# Patient Record
Sex: MALE | Race: WHITE | NOT HISPANIC OR LATINO | Employment: FULL TIME | URBAN - METROPOLITAN AREA
[De-identification: names, ages, dates, MRNs, and addresses within clinical notes are randomized per-mention and may not be internally consistent; named-entity substitution may affect disease eponyms.]

---

## 2021-04-08 ENCOUNTER — HOSPITAL ENCOUNTER (OUTPATIENT)
Dept: URGENT CARE | Facility: CLINIC | Age: 46
Discharge: HOME | End: 2021-04-08
Attending: EMERGENCY MEDICINE

## 2021-09-20 ENCOUNTER — OFFICE VISIT (OUTPATIENT)
Dept: FAMILY MEDICINE CLINIC | Facility: CLINIC | Age: 46
End: 2021-09-20

## 2021-09-20 VITALS
SYSTOLIC BLOOD PRESSURE: 129 MMHG | DIASTOLIC BLOOD PRESSURE: 70 MMHG | BODY MASS INDEX: 21.73 KG/M2 | TEMPERATURE: 98.1 F | HEART RATE: 94 BPM | HEIGHT: 72 IN | WEIGHT: 160.4 LBS | OXYGEN SATURATION: 96 %

## 2021-09-20 DIAGNOSIS — K21.9 GASTROESOPHAGEAL REFLUX DISEASE WITHOUT ESOPHAGITIS: ICD-10-CM

## 2021-09-20 DIAGNOSIS — Z12.5 PROSTATE CANCER SCREENING: ICD-10-CM

## 2021-09-20 DIAGNOSIS — Z72.0 TOBACCO ABUSE: ICD-10-CM

## 2021-09-20 DIAGNOSIS — D50.8 OTHER IRON DEFICIENCY ANEMIA: ICD-10-CM

## 2021-09-20 DIAGNOSIS — Z13.29 SCREENING FOR THYROID DISORDER: ICD-10-CM

## 2021-09-20 DIAGNOSIS — Z01.89 ROUTINE LAB DRAW: ICD-10-CM

## 2021-09-20 DIAGNOSIS — Z13.220 LIPID SCREENING: ICD-10-CM

## 2021-09-20 DIAGNOSIS — R10.13 EPIGASTRIC ABDOMINAL PAIN: ICD-10-CM

## 2021-09-20 DIAGNOSIS — Z76.89 ESTABLISHING CARE WITH NEW DOCTOR, ENCOUNTER FOR: Primary | ICD-10-CM

## 2021-09-20 PROCEDURE — 99203 OFFICE O/P NEW LOW 30 MIN: CPT | Performed by: NURSE PRACTITIONER

## 2021-09-20 RX ORDER — OMEPRAZOLE 20 MG/1
20 CAPSULE, DELAYED RELEASE ORAL DAILY
COMMUNITY
End: 2021-09-28 | Stop reason: SDUPTHER

## 2021-09-20 NOTE — PROGRESS NOTES
"Chief Complaint  Establish Care and Heartburn  Abdominal pain  Subjective          Abhijit Graham presents to [facility]  History of Present Illness    New patient to establish primary care, he has not had PCP since he was a child.  Patient states he uses ER for primary care.    Patient complaining of stomach issues, abdominal cramping.  Patient states he was dx with H. Pylori 9/14/20 but has not completed treatment in the last year d/t cost of medication.  Patient states he had EGD in 2017 in [place] which showed he had unknown type  Of ulcer.  Patient states he had EGD done at the time d/t loosing so much blood and passing out.  Patient currently takes Prilosec OTC with overall good control of symptoms. Pt has been taking daily.  Patient states he previously took Protonix, which worked better, but he ran out. Pt does report nausea, worse in AM, denies any vomiting. He denies any bowel or bladder issues.       Past Medical History:   Diagnosis Date   • Anemia    • GERD (gastroesophageal reflux disease)          No Known Allergies       History reviewed. No pertinent surgical history.       Social History     Tobacco Use   • Smoking status: Current Every Day Smoker     Packs/day: 1.00     Years: 30.00     Pack years: 30.00   • Smokeless tobacco: Never Used   Substance Use Topics   • Alcohol use: Never         Family History   Adopted: Yes          Current Outpatient Medications on File Prior to Visit   Medication Sig   • omeprazole (priLOSEC) 20 MG capsule Take 20 mg by mouth Daily.     No current facility-administered medications on file prior to visit.         Immunization History   Administered Date(s) Administered   • COVID-19 (PFIZER) 04/27/2021, 05/18/2021         /70 (BP Location: Left arm, Patient Position: Sitting)   Pulse 94   Temp 98.1 °F (36.7 °C) (Oral)   Ht 182.9 cm (72\")   Wt 72.8 kg (160 lb 6.4 oz)   SpO2 96%   BMI 21.75 kg/m²   "           Physical Exam  Vitals reviewed.   Constitutional:       Appearance: Normal appearance. He is well-developed.   HENT:      Head: Normocephalic and atraumatic.      Right Ear: External ear normal.      Left Ear: External ear normal.      Mouth/Throat:      Pharynx: No oropharyngeal exudate.   Eyes:      Conjunctiva/sclera: Conjunctivae normal.      Pupils: Pupils are equal, round, and reactive to light.   Cardiovascular:      Rate and Rhythm: Normal rate and regular rhythm.      Heart sounds: No murmur heard.   No friction rub. No gallop.    Pulmonary:      Effort: Pulmonary effort is normal.      Breath sounds: Normal breath sounds. No wheezing or rhonchi.   Abdominal:      General: Abdomen is flat. Bowel sounds are normal.      Palpations: Abdomen is soft.      Tenderness: There is abdominal tenderness in the epigastric area.   Skin:     General: Skin is warm and dry.   Neurological:      Mental Status: He is alert and oriented to person, place, and time.      Cranial Nerves: No cranial nerve deficit.   Psychiatric:         Mood and Affect: Mood and affect normal.         Behavior: Behavior normal.         Thought Content: Thought content normal.         Judgment: Judgment normal.             Result Review :     The following data was reviewed by: LINA Michaels on 09/20/2021:    CMP    CMP 12/31/20 4/14/21   Glucose 91 153 (A)   BUN 11 10   Creatinine 0.95 1.03   Sodium 134 (A) 136   Potassium 4.0 4.3   Chloride 98 (A) 102   Calcium 8.8 8.9   Albumin 4.0 4.1   Total Bilirubin 0.19 (A) 0.28   Alkaline Phosphatase 109 97   AST (SGOT) 18 20   ALT (SGPT) 29 17   (A) Abnormal value            CBC    CBC 12/31/20 4/14/21   WBC 12.54 (A) 8.61   RBC 4.31 (A) 5.24   Hemoglobin 11.3 (A) 11.6 (A)   Hematocrit 35.3 (A) 39.9 (A)   MCV 81.9 76.1 (A)   MCH 26.2 (A) 22.1 (A)   MCHC 32.0 (A) 29.1 (A)   RDW 14.6 (A) 17.9 (A)   Platelets 536 (A) 504 (A)   (A) Abnormal value                            Assessment and  Plan      Diagnoses and all orders for this visit:    1. Establishing care with new doctor, encounter for (Primary)    2. Gastroesophageal reflux disease without esophagitis  Comments:  Hold Prilosec, will check for H. pylori.  Further treatment contingent upon results.  Orders:  -     Helicobacter Pylori, IgA IgG IgM; Future    3. Routine lab draw    4. Prostate cancer screening  -     PSA Screen    5. Lipid screening  -     Lipid Panel; Future    6. Screening for thyroid disorder  -     TSH; Future    7. Epigastric abdominal pain  -     Comprehensive Metabolic Panel; Future  -     CBC & Differential; Future    8. Tobacco abuse  Comments:  Encouraged smoking cessation    9. Other iron deficiency anemia  -     Iron Profile        Abhijit Graham  reports that he has been smoking. He has a 30.00 pack-year smoking history. He has never used smokeless tobacco.. I have educated him on the risk of diseases from using tobacco products such as cancer, COPD and heart disease.     I advised him to quit and he is not willing to quit.    I spent 5 minutes counseling the patient.             Follow Up {Instructions Charge Capture  Follow-up Communications :23}    No follow-ups on file.    Patient was given instructions and counseling regarding his condition or for health maintenance advice. Please see specific information pulled into the AVS if appropriate.

## 2021-09-21 ENCOUNTER — LAB (OUTPATIENT)
Dept: LAB | Facility: HOSPITAL | Age: 46
End: 2021-09-21

## 2021-09-21 DIAGNOSIS — R10.13 EPIGASTRIC ABDOMINAL PAIN: ICD-10-CM

## 2021-09-21 DIAGNOSIS — Z13.29 SCREENING FOR THYROID DISORDER: ICD-10-CM

## 2021-09-21 DIAGNOSIS — K21.9 GASTROESOPHAGEAL REFLUX DISEASE WITHOUT ESOPHAGITIS: ICD-10-CM

## 2021-09-21 DIAGNOSIS — Z13.220 LIPID SCREENING: ICD-10-CM

## 2021-09-21 LAB
ALBUMIN SERPL-MCNC: 4.4 G/DL (ref 3.5–5.2)
ALBUMIN/GLOB SERPL: 2.2 G/DL
ALP SERPL-CCNC: 81 U/L (ref 39–117)
ALT SERPL W P-5'-P-CCNC: 12 U/L (ref 1–41)
ANION GAP SERPL CALCULATED.3IONS-SCNC: 11.3 MMOL/L (ref 5–15)
AST SERPL-CCNC: 14 U/L (ref 1–40)
BASOPHILS # BLD AUTO: 0.14 10*3/MM3 (ref 0–0.2)
BASOPHILS NFR BLD AUTO: 1.4 % (ref 0–1.5)
BILIRUB SERPL-MCNC: 0.2 MG/DL (ref 0–1.2)
BUN SERPL-MCNC: 17 MG/DL (ref 6–20)
BUN/CREAT SERPL: 17.2 (ref 7–25)
CALCIUM SPEC-SCNC: 9 MG/DL (ref 8.6–10.5)
CHLORIDE SERPL-SCNC: 103 MMOL/L (ref 98–107)
CHOLEST SERPL-MCNC: 133 MG/DL (ref 0–200)
CO2 SERPL-SCNC: 24.7 MMOL/L (ref 22–29)
CREAT SERPL-MCNC: 0.99 MG/DL (ref 0.76–1.27)
DEPRECATED RDW RBC AUTO: 48.1 FL (ref 37–54)
EOSINOPHIL # BLD AUTO: 0 10*3/MM3 (ref 0–0.4)
EOSINOPHIL NFR BLD AUTO: 0 % (ref 0.3–6.2)
ERYTHROCYTE [DISTWIDTH] IN BLOOD BY AUTOMATED COUNT: 17.1 % (ref 12.3–15.4)
GFR SERPL CREATININE-BSD FRML MDRD: 82 ML/MIN/1.73
GLOBULIN UR ELPH-MCNC: 2 GM/DL
GLUCOSE SERPL-MCNC: 120 MG/DL (ref 65–99)
HCT VFR BLD AUTO: 38.4 % (ref 37.5–51)
HDLC SERPL-MCNC: 35 MG/DL (ref 40–60)
HGB BLD-MCNC: 11.8 G/DL (ref 13–17.7)
IMM GRANULOCYTES # BLD AUTO: 0.02 10*3/MM3 (ref 0–0.05)
IMM GRANULOCYTES NFR BLD AUTO: 0.2 % (ref 0–0.5)
IRON 24H UR-MRATE: 23 MCG/DL (ref 59–158)
IRON SATN MFR SERPL: 4 % (ref 20–50)
LDLC SERPL CALC-MCNC: 86 MG/DL (ref 0–100)
LDLC/HDLC SERPL: 2.49 {RATIO}
LYMPHOCYTES # BLD AUTO: 2.25 10*3/MM3 (ref 0.7–3.1)
LYMPHOCYTES NFR BLD AUTO: 21.7 % (ref 19.6–45.3)
MCH RBC QN AUTO: 24.3 PG (ref 26.6–33)
MCHC RBC AUTO-ENTMCNC: 30.7 G/DL (ref 31.5–35.7)
MCV RBC AUTO: 79.2 FL (ref 79–97)
MONOCYTES # BLD AUTO: 0.75 10*3/MM3 (ref 0.1–0.9)
MONOCYTES NFR BLD AUTO: 7.2 % (ref 5–12)
NEUTROPHILS NFR BLD AUTO: 69.5 % (ref 42.7–76)
NEUTROPHILS NFR BLD AUTO: 7.21 10*3/MM3 (ref 1.7–7)
NRBC BLD AUTO-RTO: 0 /100 WBC (ref 0–0.2)
PLATELET # BLD AUTO: 463 10*3/MM3 (ref 140–450)
PMV BLD AUTO: 8.8 FL (ref 6–12)
POTASSIUM SERPL-SCNC: 3.5 MMOL/L (ref 3.5–5.2)
PROT SERPL-MCNC: 6.4 G/DL (ref 6–8.5)
PSA SERPL-MCNC: 1.02 NG/ML (ref 0–4)
RBC # BLD AUTO: 4.85 10*6/MM3 (ref 4.14–5.8)
SODIUM SERPL-SCNC: 139 MMOL/L (ref 136–145)
TIBC SERPL-MCNC: 599 MCG/DL (ref 298–536)
TRANSFERRIN SERPL-MCNC: 402 MG/DL (ref 200–360)
TRIGL SERPL-MCNC: 54 MG/DL (ref 0–150)
TSH SERPL DL<=0.05 MIU/L-ACNC: 2.55 UIU/ML (ref 0.27–4.2)
VLDLC SERPL-MCNC: 12 MG/DL (ref 5–40)
WBC # BLD AUTO: 10.37 10*3/MM3 (ref 3.4–10.8)

## 2021-09-21 PROCEDURE — 83540 ASSAY OF IRON: CPT | Performed by: NURSE PRACTITIONER

## 2021-09-21 PROCEDURE — 86677 HELICOBACTER PYLORI ANTIBODY: CPT

## 2021-09-21 PROCEDURE — G0103 PSA SCREENING: HCPCS | Performed by: NURSE PRACTITIONER

## 2021-09-21 PROCEDURE — 84466 ASSAY OF TRANSFERRIN: CPT | Performed by: NURSE PRACTITIONER

## 2021-09-21 PROCEDURE — 84443 ASSAY THYROID STIM HORMONE: CPT

## 2021-09-21 PROCEDURE — 80053 COMPREHEN METABOLIC PANEL: CPT

## 2021-09-21 PROCEDURE — 80061 LIPID PANEL: CPT

## 2021-09-21 PROCEDURE — 36415 COLL VENOUS BLD VENIPUNCTURE: CPT

## 2021-09-21 PROCEDURE — 85025 COMPLETE CBC W/AUTO DIFF WBC: CPT

## 2021-09-22 ENCOUNTER — TELEPHONE (OUTPATIENT)
Dept: FAMILY MEDICINE CLINIC | Facility: CLINIC | Age: 46
End: 2021-09-22

## 2021-09-22 DIAGNOSIS — D64.9 ANEMIA, UNSPECIFIED TYPE: Primary | ICD-10-CM

## 2021-09-22 DIAGNOSIS — R73.09 ELEVATED GLUCOSE: ICD-10-CM

## 2021-09-22 LAB
H PYLORI IGA SER-ACNC: 15.2 UNITS (ref 0–8.9)
H PYLORI IGG SER IA-ACNC: 4.77 INDEX VALUE (ref 0–0.79)
H PYLORI IGM SER-ACNC: <9 UNITS (ref 0–8.9)

## 2021-09-22 RX ORDER — FERROUS SULFATE 325(65) MG
325 TABLET ORAL
Qty: 30 TABLET | Refills: 5 | Status: SHIPPED | OUTPATIENT
Start: 2021-09-22

## 2021-09-22 NOTE — TELEPHONE ENCOUNTER
----- Message from LINA Michaels sent at 9/22/2021  7:31 AM EDT -----  Patient needs to begin ferrous sulfate 325 mg one po daily #30/5rf. Return stools for occult blood times 3.

## 2021-09-22 NOTE — TELEPHONE ENCOUNTER
----- Message from LINA Michaels sent at 9/22/2021  7:28 AM EDT -----  Glucose is elevated add hemoglobin A1c.  Lipid panel normal.  Hemoglobin slightly low add iron profile.

## 2021-09-28 ENCOUNTER — TELEPHONE (OUTPATIENT)
Dept: FAMILY MEDICINE CLINIC | Facility: CLINIC | Age: 46
End: 2021-09-28

## 2021-09-28 RX ORDER — LANSOPRAZOLE/AMOXICILN/CLARITH 30-500-500
COMBINATION PACKAGE (EA) ORAL 2 TIMES DAILY
Qty: 1 KIT | Refills: 0 | Status: SHIPPED | OUTPATIENT
Start: 2021-09-28

## 2021-09-28 NOTE — TELEPHONE ENCOUNTER
----- Message from LINA Michaels sent at 9/28/2021  7:23 AM EDT -----  Hold pt omeprazole and begin Prevpac as directed #1/nr ensure follow up appt in 6 weeks

## 2022-01-04 ENCOUNTER — LAB (OUTPATIENT)
Dept: LAB | Facility: HOSPITAL | Age: 47
End: 2022-01-04

## 2022-01-04 ENCOUNTER — TRANSCRIBE ORDERS (OUTPATIENT)
Dept: LAB | Facility: HOSPITAL | Age: 47
End: 2022-01-04

## 2022-01-04 DIAGNOSIS — K56.609 PEPTIC ULCER WITHOUT HEMORRHAGE OR PERFORATION BUT WITH OBSTRUCTION: Primary | ICD-10-CM

## 2022-01-04 DIAGNOSIS — K27.9 PEPTIC ULCER WITHOUT HEMORRHAGE OR PERFORATION BUT WITH OBSTRUCTION: ICD-10-CM

## 2022-01-04 DIAGNOSIS — K56.609 PEPTIC ULCER WITHOUT HEMORRHAGE OR PERFORATION BUT WITH OBSTRUCTION: ICD-10-CM

## 2022-01-04 DIAGNOSIS — K27.9 PEPTIC ULCER WITHOUT HEMORRHAGE OR PERFORATION BUT WITH OBSTRUCTION: Primary | ICD-10-CM

## 2022-01-04 LAB — UREA BREATH TEST QL: POSITIVE

## 2022-01-04 PROCEDURE — 83013 H PYLORI (C-13) BREATH: CPT

## 2022-06-08 ENCOUNTER — HOSPITAL ENCOUNTER (EMERGENCY)
Facility: HOSPITAL | Age: 47
Discharge: HOME | End: 2022-06-09
Attending: EMERGENCY MEDICINE | Admitting: EMERGENCY MEDICINE

## 2022-06-08 DIAGNOSIS — M54.42 ACUTE LEFT-SIDED LOW BACK PAIN WITH LEFT-SIDED SCIATICA: Primary | ICD-10-CM

## 2022-06-08 PROCEDURE — 99283 EMERGENCY DEPT VISIT LOW MDM: CPT

## 2022-06-08 PROCEDURE — 96372 THER/PROPH/DIAG INJ SC/IM: CPT

## 2022-06-09 VITALS
HEART RATE: 94 BPM | WEIGHT: 172.4 LBS | RESPIRATION RATE: 18 BRPM | OXYGEN SATURATION: 99 % | BODY MASS INDEX: 23.35 KG/M2 | TEMPERATURE: 98.2 F | DIASTOLIC BLOOD PRESSURE: 84 MMHG | HEIGHT: 72 IN | SYSTOLIC BLOOD PRESSURE: 136 MMHG

## 2022-06-09 PROCEDURE — 25010000002 KETOROLAC TROMETHAMINE PER 15 MG: Performed by: EMERGENCY MEDICINE

## 2022-06-09 PROCEDURE — 63710000001 PREDNISONE PER 5 MG: Performed by: EMERGENCY MEDICINE

## 2022-06-09 PROCEDURE — 96372 THER/PROPH/DIAG INJ SC/IM: CPT

## 2022-06-09 RX ORDER — HYDROCODONE BITARTRATE AND ACETAMINOPHEN 7.5; 325 MG/1; MG/1
1 TABLET ORAL ONCE
Status: COMPLETED | OUTPATIENT
Start: 2022-06-09 | End: 2022-06-09

## 2022-06-09 RX ORDER — PREDNISONE 20 MG/1
40 TABLET ORAL DAILY
Qty: 10 TABLET | Refills: 0 | Status: SHIPPED | OUTPATIENT
Start: 2022-06-09

## 2022-06-09 RX ORDER — HYDROCODONE BITARTRATE AND ACETAMINOPHEN 7.5; 325 MG/1; MG/1
1 TABLET ORAL EVERY 6 HOURS PRN
Qty: 12 TABLET | Refills: 0 | Status: SHIPPED | OUTPATIENT
Start: 2022-06-09

## 2022-06-09 RX ORDER — KETOROLAC TROMETHAMINE 30 MG/ML
30 INJECTION, SOLUTION INTRAMUSCULAR; INTRAVENOUS ONCE
Status: COMPLETED | OUTPATIENT
Start: 2022-06-09 | End: 2022-06-09

## 2022-06-09 RX ADMIN — PREDNISONE 60 MG: 50 TABLET ORAL at 00:52

## 2022-06-09 RX ADMIN — KETOROLAC TROMETHAMINE 30 MG: 30 INJECTION, SOLUTION INTRAMUSCULAR; INTRAVENOUS at 00:53

## 2022-06-09 RX ADMIN — HYDROCODONE BITARTRATE AND ACETAMINOPHEN 1 TABLET: 7.5; 325 TABLET ORAL at 00:52

## 2022-06-09 NOTE — DISCHARGE INSTRUCTIONS
Take Norco for pain as needed and take the prednisone daily to reduce inflammation on the nerve.    Follow-up with your doctor to discuss need for possible MRI of the lumbar spine to evaluate further.

## 2022-06-09 NOTE — ED PROVIDER NOTES
"Time: 0026  Arrived by: Private vehicle  Chief Complaint: Left hip pain rating down left leg  History provided by: Patient    History of Present Illness:  Patient is a 47 y.o. year old male that presents to the emergency department with report of acute nontraumatic left hip pain rating other down the left leg that is moderately severe measuring 8 out of 10 in intensity and sharp and shooting in nature.    Patient states the pain has been there for about 2 weeks but got much worse tonight.    He is not having any fevers or chills or any weakness or numbness in the legs or any bowel or bladder incontinence.    He denies any recent injuries or falls.    He states the pain feels almost like it is radiating down his left buttock and down the leg posteriorly to his left calf muscle and almost feels like it is \"cramping\".    Is unable to get comfortable despite changing positions.    Similar Symptoms Previously: No  Recently seen: Yes, recently had some x-rays      Patient Care Team  Primary Care Provider: Dr. Scott    Past Medical History:   Anemia, GERD    Social History     Socioeconomic History   • Marital status: Single   Tobacco Use   • Smoking status: Current Every Day Smoker     Packs/day: 1.00     Years: 30.00     Pack years: 30.00   • Smokeless tobacco: Never Used   Vaping Use   • Vaping Use: Never used   Substance and Sexual Activity   • Alcohol use: Never   • Drug use: Never   • Sexual activity: Defer         No Known Allergies  Past Medical History:   Diagnosis Date   • Anemia    • GERD (gastroesophageal reflux disease)      History reviewed. No pertinent surgical history.  Family History   Adopted: Yes       Home Medications:  Prior to Admission medications    Medication Sig Start Date End Date Taking? Authorizing Provider   ferrous sulfate 325 (65 FE) MG tablet Take 1 tablet by mouth Daily With Breakfast. 9/22/21   Doreen Mejía APRN   Prevpac combo pack Take  by mouth 2 (Two) Times a Day. Follow " "package directions. 9/28/21   Doreen Mejía, APRN        Record Review:  I have reviewed the patient's records in [facility].     Review of Systems:  Review of Systems   I performed a 10 point review of systems which was all negative, except for the positives found in the HPI above.  Physical Exam:  /84 (BP Location: Right arm, Patient Position: Sitting)   Pulse 94   Temp 98.2 °F (36.8 °C) (Oral)   Resp 18   Ht 182.9 cm (72\")   Wt 78.2 kg (172 lb 6.4 oz)   SpO2 99%   BMI 23.38 kg/m²     Physical Exam   General: Awake alert and in moderate distress holding left leg and pain    HEENT: Head normocephalic atraumatic, eyes PERRLA EOMI, nose normal, oropharynx normal.    Neck: Supple full range of motion, no meningismus, no lymphadenopathy    Heart: Regular rate and rhythm, no murmurs or rubs, 2+ radial pulses bilaterally    Lungs: Clear to auscultation bilaterally without wheezes or crackles, no respiratory distress    Abdomen: Soft, nontender, nondistended, no rebound or guarding    Back exam: No thoracic or lumbar spinal tenderness to palpation and no CVA tenderness.    Skin: Warm, dry, no rash    Musculoskeletal: Normal range of motion, no lower extremity edema; patient has positive straight leg raise sign on the left consistent with sciatica, but otherwise neurovascular intact and no sign of injury or infection in the left lower leg.    Neurologic: Oriented x3, no motor deficits no sensory deficits    Psychiatric: Mood appears stable, no psychosis        Medications in the Emergency Department:  Medications   ketorolac (TORADOL) injection 30 mg (30 mg Intramuscular Given 6/9/22 0053)   HYDROcodone-acetaminophen (NORCO) 7.5-325 MG per tablet 1 tablet (1 tablet Oral Given 6/9/22 0052)   predniSONE (DELTASONE) tablet 60 mg (60 mg Oral Given 6/9/22 0052)        Labs  Lab Results (last 24 hours)     ** No results found for the last 24 hours. **           Imaging:  No Radiology Exams Resulted Within Past 24 Hours "     Procedures:  Procedures    Progress                            Medical Decision Making:  [facility]     Differential diagnosis for this patient's back pain includes muscle strain, vertebral compression fracture, radiculopathy, ligamentous sprain, herniated disc, renal colic and pyelonephritis.      This patient is a 47-year-old male presenting with signs of left-sided sciatica on exam, now going on intermittently for 2 weeks but getting much worse in pain lately..    No red flags for this back pain including no neurologic deficits or fevers or recent trauma or any bowel or bladder incontinence found.    I am giving him a shot of Toradol for pain and a hydrocodone here and some prednisone as a anti-inflammatory since he cannot take oral NSAIDs.    I am calling in some pain meds and prednisone course for now and having him follow-up with his doctor to discuss possible need for lumbar spine MRI..    Final diagnoses:   Acute left-sided low back pain with left-sided sciatica        Disposition:  ED Disposition     ED Disposition   Discharge    Condition   Stable    Comment   --                    Estuardo Negro MD  06/09/22 0221